# Patient Record
Sex: FEMALE | Race: WHITE | URBAN - METROPOLITAN AREA
[De-identification: names, ages, dates, MRNs, and addresses within clinical notes are randomized per-mention and may not be internally consistent; named-entity substitution may affect disease eponyms.]

---

## 2021-07-26 ENCOUNTER — HOSPITAL ENCOUNTER (EMERGENCY)
Age: 51
Discharge: HOME | End: 2021-07-27
Attending: EMERGENCY MEDICINE
Payer: COMMERCIAL

## 2021-07-26 VITALS
OXYGEN SATURATION: 98 % | HEART RATE: 59 BPM | TEMPERATURE: 97.8 F | DIASTOLIC BLOOD PRESSURE: 66 MMHG | SYSTOLIC BLOOD PRESSURE: 99 MMHG | RESPIRATION RATE: 16 BRPM

## 2021-07-26 DIAGNOSIS — S05.01XA ABRASION OF RIGHT CORNEA, INITIAL ENCOUNTER: Primary | ICD-10-CM

## 2021-07-26 PROCEDURE — 6370000000 HC RX 637 (ALT 250 FOR IP): Performed by: STUDENT IN AN ORGANIZED HEALTH CARE EDUCATION/TRAINING PROGRAM

## 2021-07-26 PROCEDURE — 99283 EMERGENCY DEPT VISIT LOW MDM: CPT

## 2021-07-26 RX ORDER — ERYTHROMYCIN 5 MG/G
OINTMENT OPHTHALMIC EVERY 6 HOURS SCHEDULED
Status: DISCONTINUED | OUTPATIENT
Start: 2021-07-27 | End: 2021-07-27 | Stop reason: HOSPADM

## 2021-07-26 RX ORDER — POLYMYXIN B SULFATE AND TRIMETHOPRIM 1; 10000 MG/ML; [USP'U]/ML
1 SOLUTION OPHTHALMIC EVERY 6 HOURS SCHEDULED
Status: DISCONTINUED | OUTPATIENT
Start: 2021-07-27 | End: 2021-07-26

## 2021-07-26 RX ORDER — TETRACAINE HYDROCHLORIDE 5 MG/ML
2 SOLUTION OPHTHALMIC ONCE
Status: COMPLETED | OUTPATIENT
Start: 2021-07-26 | End: 2021-07-26

## 2021-07-26 RX ORDER — POLYMYXIN B SULFATE AND TRIMETHOPRIM 1; 10000 MG/ML; [USP'U]/ML
1 SOLUTION OPHTHALMIC EVERY 6 HOURS
Qty: 10 ML | Refills: 1 | Status: SHIPPED | OUTPATIENT
Start: 2021-07-26 | End: 2021-07-26

## 2021-07-26 RX ADMIN — FLUORESCEIN SODIUM 1 MG: 1 STRIP OPHTHALMIC at 23:29

## 2021-07-26 RX ADMIN — TETRACAINE HYDROCHLORIDE 2 DROP: 5 SOLUTION OPHTHALMIC at 23:30

## 2021-07-27 PROCEDURE — 6370000000 HC RX 637 (ALT 250 FOR IP): Performed by: STUDENT IN AN ORGANIZED HEALTH CARE EDUCATION/TRAINING PROGRAM

## 2021-07-27 RX ADMIN — ERYTHROMYCIN: 5 OINTMENT OPHTHALMIC at 00:17

## 2021-07-27 NOTE — ED PROVIDER NOTES
iris  ENT:  No discharge from nose. Oropharynx clear. Neck:  Supple, trachea midline  Pulmonary:   Non-labored breathing. Breath sounds clear bilaterally  Cardiac:  Regular rate and rhythm. No murmurs  Abdomen:  Soft. Non-tender. Non-distended  Musculoskeletal:  No long bone deformity. Vascular:  Extremities warm and perfused. Normal pulses in all 4 extremities. Skin:  Dry, no rashes, no bruising  Extremities:  No peripheral edema  Neuro:  Alert. Moves all four extremities to command. No focal deficit  Psych: Appropriate mood and affect to the clinical situation     DiagnosticResults     RADIOLOGY:  No orders to display       LABS:   No results found for this visit on 07/26/21. RECENT VITALS:  BP: 99/66, Temp: 97.8 °F (36.6 °C), Pulse: 59,Resp: 16, SpO2: 98 %     Procedures       ED Course     Nursing Notes, Past Medical Hx, Past Surgical Hx, Social Hx, Allergies, and Family Hx were reviewed. The patient was given the followingmedications:  Orders Placed This Encounter   Medications    tetracaine (TETRAVISC) 0.5 % ophthalmic solution 2 drop    fluorescein ophthalmic strip 1 mg    trimethoprim-polymyxin b (POLYTRIM) 96979-9.1 UNIT/ML-% ophthalmic solution     Sig: Place 1 drop into the right eye every 6 hours for 10 days     Dispense:  10 mL     Refill:  1       CONSULTS:  None    MEDICAL DECISION MAKING / ASSESSMENT / Keegan Johnathan is a 45 y.o. female presented with chief complaint of right eye pain. History suggests corneal abrasion versus corneal ulcer. No evidence of open globe. No evidence of acute angle-closure glaucoma. No evidence of ocular periorbital infection. Slit-lamp examination demonstrates evidence of fluorescein uptake in the superior portion of the eye, overlying the iris, and 1 small aspect superior to the iris suggestive of corneal abrasion. No evidence of foreign body on lid eversion. Patient reports pain has resolved with tetracaine eyedrops.   We will discharge her with a course of antibiotic drops with close outpatient ophthalmology follow-up. This patient was also evaluated by the attending physician. All care plans were discussed and agreed upon. Clinical Impression     1.  Abrasion of right cornea, initial encounter        Disposition     PATIENT REFERRED TO:  Estefani Barlow [facility] [id number]  [phone]            DISCHARGE MEDICATIONS:  New Prescriptions    TRIMETHOPRIM-POLYMYXIN B (POLYTRIM) 24383-0.1 UNIT/ML-% OPHTHALMIC SOLUTION    Place 1 drop into the right eye every 6 hours for 10 days       DISPOSITION Decision To Discharge 07/26/2021 11:40:25 PM       Stephanie James MD  Resident  07/26/21 [id number]

## 2021-07-27 NOTE — ED NOTES
Patient discharged to home with family. Patient verbalized understanding of discharge instructions. Advised of when to return to ED and when to call 911. Advised of follow up with PCP and opthalmology . Patient received 1 Rx with education. Patient verbalized understanding of education. No questions from patient to this RN. Patient left ED without incident.       Radha Rajan RN  07/27/21 0784

## 2021-07-27 NOTE — ED PROVIDER NOTES
ED Attending Attestation Note     Date of evaluation: 7/26/2021    This patient was seen by the resident. I have seen and examined the patient, agree with the workup, evaluation, management and diagnosis. The care plan has been discussed. My assessment reveals patient with right eye discomfort. Found to have corneal abrasions. Will start erythromycin topical ointment.      Ravindra Casanova MD  07/27/21 [id number]

## 2022-11-21 ENCOUNTER — NEW PATIENT EMERGENCY (OUTPATIENT)
Dept: URBAN - METROPOLITAN AREA CLINIC 79 | Facility: CLINIC | Age: 52
End: 2022-11-21

## 2022-11-21 DIAGNOSIS — H00.031: ICD-10-CM

## 2022-11-21 PROCEDURE — 99203 OFFICE O/P NEW LOW 30 MIN: CPT

## 2022-11-21 ASSESSMENT — VISUAL ACUITY
OS_CC: 20/20-2
OD_CC: 20/25-2

## 2022-11-21 ASSESSMENT — TONOMETRY: OD_IOP_MMHG: 16

## 2022-11-23 ENCOUNTER — FOLLOW UP (OUTPATIENT)
Dept: URBAN - METROPOLITAN AREA CLINIC 79 | Facility: CLINIC | Age: 52
End: 2022-11-23

## 2022-11-23 DIAGNOSIS — H00.031: ICD-10-CM

## 2022-11-23 PROCEDURE — 99213 OFFICE O/P EST LOW 20 MIN: CPT

## 2022-11-23 ASSESSMENT — TONOMETRY
OS_IOP_MMHG: 18
OD_IOP_MMHG: 19

## 2022-11-23 ASSESSMENT — VISUAL ACUITY
OS_CC: 20/20-2
OD_CC: 20/40-2
OD_PH: 20/30-2

## 2024-05-31 ENCOUNTER — ESTABLISHED COMPREHENSIVE EXAM (OUTPATIENT)
Dept: URBAN - METROPOLITAN AREA CLINIC 79 | Facility: CLINIC | Age: 54
End: 2024-05-31

## 2024-05-31 DIAGNOSIS — H52.4: ICD-10-CM

## 2024-05-31 DIAGNOSIS — H52.13: ICD-10-CM

## 2024-05-31 DIAGNOSIS — H17.9: ICD-10-CM

## 2024-05-31 DIAGNOSIS — H25.813: ICD-10-CM

## 2024-05-31 PROCEDURE — 92014 COMPRE OPH EXAM EST PT 1/>: CPT

## 2024-05-31 PROCEDURE — 92015 DETERMINE REFRACTIVE STATE: CPT

## 2024-05-31 ASSESSMENT — VISUAL ACUITY
OD_CC: 20/20
OS_CC: 20/25
OD_CC: J2
OD_SC: 20/200
OS_SC: 20/125
OS_CC: J3

## 2024-05-31 ASSESSMENT — TONOMETRY
OS_IOP_MMHG: 20
OD_IOP_MMHG: 18

## 2024-06-03 ENCOUNTER — CONTACT LENS REFIT (OUTPATIENT)
Dept: URBAN - METROPOLITAN AREA CLINIC 79 | Facility: CLINIC | Age: 54
End: 2024-06-03

## 2024-06-03 DIAGNOSIS — H52.4: ICD-10-CM

## 2024-06-03 DIAGNOSIS — Z97.3: ICD-10-CM

## 2024-06-03 DIAGNOSIS — H52.13: ICD-10-CM

## 2024-06-03 PROCEDURE — 92015 DETERMINE REFRACTIVE STATE: CPT | Mod: NC

## 2024-06-03 PROCEDURE — 92310 CONTACT LENS FITTING OU: CPT

## 2024-06-03 ASSESSMENT — VISUAL ACUITY
OS_CC: 20/25-1
OD_CC: 20/20-2

## 2025-06-20 ENCOUNTER — ESTABLISHED COMPREHENSIVE EXAM (OUTPATIENT)
Dept: URBAN - METROPOLITAN AREA CLINIC 79 | Facility: CLINIC | Age: 55
End: 2025-06-20

## 2025-06-20 DIAGNOSIS — H04.123: ICD-10-CM

## 2025-06-20 DIAGNOSIS — H52.13: ICD-10-CM

## 2025-06-20 DIAGNOSIS — H33.102: ICD-10-CM

## 2025-06-20 DIAGNOSIS — L71.9: ICD-10-CM

## 2025-06-20 DIAGNOSIS — H02.88A: ICD-10-CM

## 2025-06-20 DIAGNOSIS — H52.4: ICD-10-CM

## 2025-06-20 DIAGNOSIS — Z97.3: ICD-10-CM

## 2025-06-20 DIAGNOSIS — H25.813: ICD-10-CM

## 2025-06-20 DIAGNOSIS — H02.88B: ICD-10-CM

## 2025-06-20 PROCEDURE — 83516 IMMUNOASSAY NONANTIBODY: CPT

## 2025-06-20 PROCEDURE — 92015 DETERMINE REFRACTIVE STATE: CPT

## 2025-06-20 PROCEDURE — 99214 OFFICE O/P EST MOD 30 MIN: CPT

## 2025-06-20 PROCEDURE — 92310 CONTACT LENS FITTING OU: CPT

## 2025-06-20 PROCEDURE — MISCLIPIVIEW MISC LIPIVIEW

## 2025-06-20 PROCEDURE — 83861 MICROFLUID ANALY TEARS: CPT

## 2025-06-20 PROCEDURE — 92250 FUNDUS PHOTOGRAPHY W/I&R: CPT

## 2025-06-20 ASSESSMENT — VISUAL ACUITY
OD_CC: J1+
OD_CC: 20/20
OD_SC: 20/250
OS_CC: 20/20-2
OS_CC: J1+
OS_SC: 20/125

## 2025-06-20 ASSESSMENT — TONOMETRY
OD_IOP_MMHG: 19
OS_IOP_MMHG: 21

## 2025-07-11 ENCOUNTER — PROCEDURE ONLY (OUTPATIENT)
Dept: URBAN - METROPOLITAN AREA CLINIC 79 | Facility: CLINIC | Age: 55
End: 2025-07-11

## 2025-07-11 DIAGNOSIS — H02.88A: ICD-10-CM

## 2025-07-11 DIAGNOSIS — H04.123: ICD-10-CM

## 2025-07-11 DIAGNOSIS — L71.9: ICD-10-CM

## 2025-07-11 DIAGNOSIS — H02.88B: ICD-10-CM

## 2025-07-11 PROCEDURE — MISCIPL MISC IPL

## 2025-07-11 ASSESSMENT — TONOMETRY
OD_IOP_MMHG: 14
OS_IOP_MMHG: 16

## 2025-07-11 ASSESSMENT — VISUAL ACUITY
OD_CC: 20/20
OS_CC: 20/25-1

## 2025-08-29 ENCOUNTER — PROCEDURE ONLY (OUTPATIENT)
Dept: URBAN - METROPOLITAN AREA CLINIC 79 | Facility: CLINIC | Age: 55
End: 2025-08-29

## 2025-08-29 DIAGNOSIS — H02.88A: ICD-10-CM

## 2025-08-29 DIAGNOSIS — H02.88B: ICD-10-CM

## 2025-08-29 PROCEDURE — MISCIPL MISC IPL

## 2025-08-29 ASSESSMENT — VISUAL ACUITY
OD_CC: 20/20
OS_CC: 20/20

## (undated) RX ORDER — PERFLUOROHEXYLOCTANE 1 MG/MG: 1 SOLUTION OPHTHALMIC

## (undated) RX ORDER — CEPHALEXIN 500 MG/1: 1 CAPSULE ORAL TWICE A DAY